# Patient Record
Sex: FEMALE | Race: BLACK OR AFRICAN AMERICAN | URBAN - METROPOLITAN AREA
[De-identification: names, ages, dates, MRNs, and addresses within clinical notes are randomized per-mention and may not be internally consistent; named-entity substitution may affect disease eponyms.]

---

## 2017-01-02 ENCOUNTER — THERAPY VISIT (OUTPATIENT)
Dept: PHYSICAL THERAPY | Facility: CLINIC | Age: 47
End: 2017-01-02
Payer: COMMERCIAL

## 2017-01-02 DIAGNOSIS — M79.672 LEFT FOOT PAIN: ICD-10-CM

## 2017-01-02 DIAGNOSIS — M79.671 RIGHT FOOT PAIN: ICD-10-CM

## 2017-01-02 DIAGNOSIS — M72.2 PLANTAR FASCIITIS: Primary | ICD-10-CM

## 2017-01-02 PROCEDURE — 97033 APP MDLTY 1+IONTPHRSIS EA 15: CPT | Mod: GP | Performed by: PHYSICAL THERAPIST

## 2017-01-19 ENCOUNTER — THERAPY VISIT (OUTPATIENT)
Dept: PHYSICAL THERAPY | Facility: CLINIC | Age: 47
End: 2017-01-19
Payer: COMMERCIAL

## 2017-01-19 DIAGNOSIS — M72.2 PLANTAR FASCIITIS: Primary | ICD-10-CM

## 2017-01-19 DIAGNOSIS — M79.672 LEFT FOOT PAIN: ICD-10-CM

## 2017-01-19 DIAGNOSIS — M79.671 RIGHT FOOT PAIN: ICD-10-CM

## 2017-01-19 PROCEDURE — 97033 APP MDLTY 1+IONTPHRSIS EA 15: CPT | Mod: GP | Performed by: PHYSICAL THERAPIST

## 2017-01-19 NOTE — Clinical Note
[facility]  [address]  [place] [address]  [phone]    2017    Re: Siri Llamas   :   1970  MRN:  [id number]   REFERRING PHYSICIAN:   Ortega Cee    [facility] DEAN  Date of Initial Evaluation:  16  Visits:  Rxs Used: 14  Reason for Referral:     Plantar fasciitis  Right foot pain  Left foot pain    DISCHARGE REPORT  Progress reporting period is from 16 to 17.       SUBJECTIVE  Patient reports minimal change in her left heel pain since completing 6 sessions of iontophoresis. This past weekend at work, she noticed numbness in the plantar surface of her left heel and forefoot during 3 consecutive shifts (Fri/Sat/Sun). Symptoms eventually resolved after a few hours, but she is concerned because she has never had this before.    Current pain level is 6/10  Changes in function:  None  Adverse reaction to treatment or activity: None    OBJECTIVE  AROM L Ankle: WNL  Strength L Ankle: 5/5 all motions  Palpation: tenderness persists over medial left calcaneal tubercle and medial aspect of heel    Edema: mild swelling remains in bilateral ankles.  Tinel's Sign over tarsal tunnel: negative    ASSESSMENT/PLAN  STG/LTGs have been met or progress has been made towards goals:  None  Assessment of Progress: The patient's condition is unchanged.  Self Management Plans:  Patient has been instructed in a home treatment program.  Siri continues to require the following intervention to meet STG and LTG's:  MD    Recommendations:  Plan to discharge from physical therapy at this time as Rx modalities, including iontophoresis, have been minimally effective on alleviating patient's left heel pain. Recommend MD re-evaluation.                      Thank you for your referral.              INQUIRIES  Therapist: [place] Camilo, JERMAINE   [facility]  [address]  [place] [address]  Phone: [phone]  Fax: [phone]

## 2017-01-19 NOTE — PROGRESS NOTES
Subjective:    HPI                    Objective:    System    Physical Exam    General     ROS    Assessment/Plan:      DISCHARGE REPORT    Progress reporting period is from 11/141/6 to 1/19/17.       SUBJECTIVE  Patient reports minimal change in her left heel pain since completing 6 sessions of iontophoresis. This past weekend at work, she noticed numbness in the plantar surface of her left heel and forefoot during 3 consecutive shifts (Fri/Sat/Sun). Symptoms eventually resolved after a few hours, but she is concerned because she has never had this before.    Current pain level is 6/10  Changes in function:  None  Adverse reaction to treatment or activity: None    OBJECTIVE  AROM L Ankle: WNL  Strength L Ankle: 5/5 all motions  Palpation: tenderness persists over medial left calcaneal tubercle and medial aspect of heel    Edema: mild swelling remains in bilateral ankles.  Tinel's Sign over tarsal tunnel: negative    ASSESSMENT/PLAN  STG/LTGs have been met or progress has been made towards goals:  None  Assessment of Progress: The patient's condition is unchanged.  Self Management Plans:  Patient has been instructed in a home treatment program.  Siri continues to require the following intervention to meet STG and LTG's:  MD    Recommendations:  Plan to discharge from physical therapy at this time as Rx modalities, including iontophoresis, have been minimally effective on alleviating patient's left heel pain. Recommend MD re-evaluation.      Please refer to the daily flowsheet for treatment today, total treatment time and time spent performing 1:1 timed codes.

## 2017-09-10 ENCOUNTER — HEALTH MAINTENANCE LETTER (OUTPATIENT)
Age: 47
End: 2017-09-10

## 2019-11-08 ENCOUNTER — HEALTH MAINTENANCE LETTER (OUTPATIENT)
Age: 49
End: 2019-11-08

## 2020-02-23 ENCOUNTER — HEALTH MAINTENANCE LETTER (OUTPATIENT)
Age: 50
End: 2020-02-23

## 2020-12-06 ENCOUNTER — HEALTH MAINTENANCE LETTER (OUTPATIENT)
Age: 50
End: 2020-12-06

## 2021-02-20 ENCOUNTER — HEALTH MAINTENANCE LETTER (OUTPATIENT)
Age: 51
End: 2021-02-20

## 2021-09-25 ENCOUNTER — HEALTH MAINTENANCE LETTER (OUTPATIENT)
Age: 51
End: 2021-09-25

## 2022-01-15 ENCOUNTER — HEALTH MAINTENANCE LETTER (OUTPATIENT)
Age: 52
End: 2022-01-15

## 2022-03-12 ENCOUNTER — HEALTH MAINTENANCE LETTER (OUTPATIENT)
Age: 52
End: 2022-03-12

## 2023-01-07 ENCOUNTER — HEALTH MAINTENANCE LETTER (OUTPATIENT)
Age: 53
End: 2023-01-07

## 2023-04-22 ENCOUNTER — HEALTH MAINTENANCE LETTER (OUTPATIENT)
Age: 53
End: 2023-04-22